# Patient Record
Sex: FEMALE | Race: WHITE | NOT HISPANIC OR LATINO | Employment: OTHER | URBAN - METROPOLITAN AREA
[De-identification: names, ages, dates, MRNs, and addresses within clinical notes are randomized per-mention and may not be internally consistent; named-entity substitution may affect disease eponyms.]

---

## 2017-11-07 DIAGNOSIS — Z12.31 VISIT FOR SCREENING MAMMOGRAM: Primary | ICD-10-CM

## 2017-12-05 ENCOUNTER — HOSPITAL ENCOUNTER (OUTPATIENT)
Dept: RADIOLOGY | Facility: HOSPITAL | Age: 82
Discharge: HOME | End: 2017-12-05
Attending: NURSE PRACTITIONER
Payer: MEDICARE

## 2017-12-05 DIAGNOSIS — Z12.31 VISIT FOR SCREENING MAMMOGRAM: ICD-10-CM

## 2017-12-05 PROCEDURE — 77067 SCR MAMMO BI INCL CAD: CPT | Mod: TC

## 2017-12-20 ENCOUNTER — OFFICE VISIT (OUTPATIENT)
Dept: HEMATOLOGY/ONCOLOGY | Facility: CLINIC | Age: 82
End: 2017-12-20
Payer: MEDICARE

## 2017-12-20 VITALS
TEMPERATURE: 98 F | WEIGHT: 149.19 LBS | DIASTOLIC BLOOD PRESSURE: 78 MMHG | SYSTOLIC BLOOD PRESSURE: 129 MMHG | BODY MASS INDEX: 27.45 KG/M2 | HEIGHT: 62 IN | RESPIRATION RATE: 18 BRPM | HEART RATE: 72 BPM

## 2017-12-20 DIAGNOSIS — Z86.718 HISTORY OF DVT IN ADULTHOOD: ICD-10-CM

## 2017-12-20 DIAGNOSIS — D51.8 ANEMIA OF DECREASED VITAMIN B12 ABSORPTION: ICD-10-CM

## 2017-12-20 DIAGNOSIS — R92.8 ABNORMALITY OF RIGHT BREAST ON SCREENING MAMMOGRAM: ICD-10-CM

## 2017-12-20 PROCEDURE — 99214 OFFICE O/P EST MOD 30 MIN: CPT | Mod: 25,,, | Performed by: INTERNAL MEDICINE

## 2017-12-20 PROCEDURE — 96372 THER/PROPH/DIAG INJ SC/IM: CPT | Mod: ,,, | Performed by: INTERNAL MEDICINE

## 2017-12-20 RX ORDER — WARFARIN SODIUM 5 MG/1
5 TABLET ORAL
COMMUNITY

## 2017-12-20 RX ORDER — CYANOCOBALAMIN 1000 UG/ML
1000 INJECTION, SOLUTION INTRAMUSCULAR; SUBCUTANEOUS
Status: CANCELLED | OUTPATIENT
Start: 2017-12-20

## 2017-12-20 RX ORDER — LISINOPRIL AND HYDROCHLOROTHIAZIDE 20; 25 MG/1; MG/1
1 TABLET ORAL
COMMUNITY

## 2017-12-20 RX ORDER — CYANOCOBALAMIN 1000 UG/ML
1000 INJECTION, SOLUTION INTRAMUSCULAR; SUBCUTANEOUS
Status: COMPLETED | OUTPATIENT
Start: 2017-12-20 | End: 2017-12-20

## 2017-12-20 RX ADMIN — CYANOCOBALAMIN 1000 MCG: 1000 INJECTION, SOLUTION INTRAMUSCULAR; SUBCUTANEOUS at 12:12

## 2017-12-20 NOTE — LETTER
December 20, 2017      Facundo Patel NP  [address]  [place] [address]           [facility] Hematology Oncology  [address]  [address]  [facility] [address]  Phone: [phone]  Fax: [phone]          Patient: Julianne Haynes   MR Number: [id number]   YOB: 1935   Date of Visit: 12/20/2017       Dear Facundo Patel:    Thank you for referring Julianne Haynes to me for evaluation. Attached you will find relevant portions of my assessment and plan of care.    If you have questions, please do not hesitate to call me. I look forward to following Julianne Haynes along with you.    Sincerely,    BRYSON Jackman MD    Enclosure  CC:  No Recipients    If you would like to receive this communication electronically, please contact [email or web address] or [phone] to request more information on [employer] access.    For providers and/or their staff who would like to refer a patient to [facility], please contact us through our one-stop-shop provider referral line, [facility], at [phone].    If you feel you have received this communication in error or would no longer like to receive these types of communications, please e-mail [email or web address]

## 2017-12-21 NOTE — PROGRESS NOTES
"     [facility] History & Physical    Subjective:      Patient ID:   Julianne Haynes  82 y.o. female  1935  [place]      Chief Complaint:   Abn mamm.    HPI:  82 y.o. female with diagnosis of DVT, B12 deficiency.  Recent abn. Mamm. of R breast.  Diagnostic mamm ordered.  No recent clot sx, stronger on B12.  Hx HTN.  S/P knee arthroscopy.  Smoke no  ETOH no  Allergy codeine  Recently retired "[employer]".    ROS:   GEN: normal without any fever, night sweats or weight loss  HEENT: normal with no HA's, sore throat, stiff neck, changes in vision  CV: normal with no CP, SOB, PND, NUNN or orthopnea  PULM: normal with no SOB, cough, hemoptysis, sputum or pleuritic pain  GI: normal with no abdominal pain, nausea, vomiting, constipation, diarrhea, melanotic stools, BRBPR, or hematemesis  : normal with no hematuria, dysuria  BREAST: normal with no mass, discharge, pain  SKIN: normal with no rash, erythema, bruising, or swelling     Past Medical History:   Diagnosis Date    Hypertension      Past Surgical History:   Procedure Laterality Date    TOTAL KNEE ARTHROPLASTY      LEFT AND RIGHT       Review of patient's allergies indicates:   Allergen Reactions    Codeine      Social History     Social History    Marital status:      Spouse name: N/A    Number of children: N/A    Years of education: N/A     Occupational History    Not on file.     Social History Main Topics    Smoking status: Not on file    Smokeless tobacco: Not on file    Alcohol use Not on file    Drug use: Unknown    Sexual activity: Not on file     Other Topics Concern    Not on file     Social History Narrative    No narrative on file         Current Outpatient Prescriptions:     FLUAD 9147-3295, 65 YR UP,,PF, 45 mcg (15 mcg x 3)/0.5 mL Syrg, inject 0.5 milliliter intramuscularly, Disp: , Rfl: 0    hydrocodone-acetaminophen 5-325mg (NORCO) 5-325 mg per tablet, Take 1 tablet by mouth every 4 (four) hours as needed for Pain., Disp: 15 " "tablet, Rfl: 0    lisinopril-hydrochlorothiazide (PRINZIDE,ZESTORETIC) 20-25 mg Tab, Take 1 tablet by mouth once daily., Disp: , Rfl:     lisinopril-hydrochlorothiazide (PRINZIDE,ZESTORETIC) 20-25 mg Tab, Take 1 tablet by mouth., Disp: , Rfl:     warfarin (COUMADIN) 5 MG tablet, Take 5 mg by mouth once daily., Disp: , Rfl:     warfarin (COUMADIN) 5 MG tablet, Take 5 mg by mouth., Disp: , Rfl:   No current facility-administered medications for this visit.           Objective:   Vitals:  Blood pressure 129/78, pulse 72, temperature 97.5 °F (36.4 °C), resp. rate 18, height 5' 2" (1.575 m), weight 67.7 kg (149 lb 3.2 oz).    Physical Examination:   GEN: no apparent distress, comfortable  HEAD: atraumatic and normocephalic  EYES: no pallor, no icterus  ENT: no pharyngeal erythema, external ears WNL; no nasal discharge  NECK: no masses, thyroid normal, trachea midline, no LAD/LN's, supple  CV: RRR with no murmur; normal pulse; normal S1 and S2; no pedal edema  CHEST: Normal respiratory effort; CTAB; normal breath sounds; no wheeze or crackles  ABDOM: nontender and nondistended; soft; normal bowel sounds; no rebound/guarding  MUSC/Skeletal: ROM normal; no crepitus; joints normal; no deformities or arthropathy  EXTREM: no clubbing, cyanosis, inflammation or swelling  SKIN: no rashes, lesions, ulcers, petechiae   : no chu  NEURO: grossly intact; motor/sensory WNL; no tremors  PSYCH: normal mood, affect and behavior  LYMPH: normal cervical, supraclavicular, axillary and groin LN's  BREASTS: no palpable mass      Labs:   Lab Results   Component Value Date    WBC 6.60 11/02/2015    HGB 12.7 11/02/2015    HCT 39.2 11/02/2015    MCV 94 11/02/2015     11/02/2015    CMP  Sodium   Date Value Ref Range Status   11/02/2015 141 136 - 145 mmol/L Final     Potassium   Date Value Ref Range Status   11/02/2015 4.1 3.5 - 5.1 mmol/L Final     Chloride   Date Value Ref Range Status   11/02/2015 108 95 - 110 mmol/L Final     CO2 "   Date Value Ref Range Status   11/02/2015 25 23 - 29 mmol/L Final     Glucose   Date Value Ref Range Status   11/02/2015 96 70 - 110 mg/dL Final     BUN, Bld   Date Value Ref Range Status   11/02/2015 32 (H) 8 - 23 mg/dL Final     Creatinine   Date Value Ref Range Status   11/02/2015 1.4 0.5 - 1.4 mg/dL Final     Calcium   Date Value Ref Range Status   11/02/2015 9.8 8.7 - 10.5 mg/dL Final     Total Protein   Date Value Ref Range Status   11/02/2015 8.0 6.0 - 8.4 g/dL Final     Albumin   Date Value Ref Range Status   11/02/2015 4.0 3.5 - 5.2 g/dL Final     Total Bilirubin   Date Value Ref Range Status   11/02/2015 1.1 (H) 0.1 - 1.0 mg/dL Final     Comment:     For infants and newborns, interpretation of results should be based  on gestational age, weight and in agreement with clinical  observations.  Premature Infant recommended reference ranges:  Up to 24 hours.............<8.0 mg/dL  Up to 48 hours............<12.0 mg/dL  3-5 days..................<15.0 mg/dL  6-29 days.................<15.0 mg/dL       Alkaline Phosphatase   Date Value Ref Range Status   11/02/2015 119 55 - 135 U/L Final     AST   Date Value Ref Range Status   11/02/2015 28 10 - 40 U/L Final     ALT   Date Value Ref Range Status   11/02/2015 19 10 - 44 U/L Final     Anion Gap   Date Value Ref Range Status   11/02/2015 8 8 - 16 mmol/L Final     eGFR if    Date Value Ref Range Status   11/02/2015 41 (A) >60 mL/min/1.73 m^2 Final     eGFR if non    Date Value Ref Range Status   11/02/2015 36 (A) >60 mL/min/1.73 m^2 Final     Comment:     Calculation used to obtain the estimated glomerular filtration  rate (eGFR) is the CKD-EPI equation. Since race is unknown   in our information system, the eGFR values for   -American and Non--American patients are given   for each creatinine result.         Radiology/Diagnostic :    Screening mamm assymetry seen R breast      Assessment:   (1) 82 y.o. female with  diagnosis of DVT hx.    (2)Abn. Mamm. R breast.  Further evaluation to get diagnostic mamm and U/S if needed.    (3)B12 shot today.    RTC 2-3 weeks.          1. Anemia of decreased vitamin B12 absorption    2. History of DVT in adulthood    3. Abnormality of right breast on screening mammogram               Anemia of decreased vitamin B12 absorption  -     cyanocobalamin injection 1,000 mcg; Inject 1 mL (1,000 mcg total) into the skin one time.    History of DVT in adulthood    Abnormality of right breast on screening mammogram    Other orders  -     cyanocobalamin injection 1,000 mcg; Inject 1 mL (1,000 mcg total) into the muscle one time.

## 2017-12-29 ENCOUNTER — HOSPITAL ENCOUNTER (OUTPATIENT)
Dept: RADIOLOGY | Facility: HOSPITAL | Age: 82
Discharge: HOME | End: 2017-12-29
Attending: NURSE PRACTITIONER
Payer: MEDICARE

## 2017-12-29 DIAGNOSIS — R92.8 ABNORMAL MAMMOGRAM OF RIGHT BREAST: ICD-10-CM

## 2017-12-29 PROCEDURE — 77061 BREAST TOMOSYNTHESIS UNI: CPT | Mod: TC

## 2017-12-29 PROCEDURE — 77065 DX MAMMO INCL CAD UNI: CPT | Mod: 26,,, | Performed by: RADIOLOGY

## 2017-12-29 PROCEDURE — 77061 BREAST TOMOSYNTHESIS UNI: CPT | Mod: 26,,, | Performed by: RADIOLOGY

## 2018-01-03 ENCOUNTER — OFFICE VISIT (OUTPATIENT)
Dept: HEMATOLOGY/ONCOLOGY | Facility: CLINIC | Age: 83
End: 2018-01-03
Payer: MEDICARE

## 2018-01-03 VITALS
HEART RATE: 66 BPM | SYSTOLIC BLOOD PRESSURE: 138 MMHG | WEIGHT: 150.19 LBS | HEIGHT: 62 IN | DIASTOLIC BLOOD PRESSURE: 82 MMHG | TEMPERATURE: 98 F | BODY MASS INDEX: 27.64 KG/M2 | RESPIRATION RATE: 18 BRPM

## 2018-01-03 DIAGNOSIS — D51.8 ANEMIA OF DECREASED VITAMIN B12 ABSORPTION: Primary | ICD-10-CM

## 2018-01-03 DIAGNOSIS — R92.8 ABNORMALITY OF RIGHT BREAST ON SCREENING MAMMOGRAM: ICD-10-CM

## 2018-01-03 DIAGNOSIS — Z86.718 HISTORY OF DVT IN ADULTHOOD: ICD-10-CM

## 2018-01-03 PROCEDURE — 99213 OFFICE O/P EST LOW 20 MIN: CPT | Mod: ,,, | Performed by: INTERNAL MEDICINE

## 2018-01-03 NOTE — LETTER
January 5, 2018      Facundo Patel NP  [address]  [place] [address]           [facility] Hematology Oncology  [address]  [address]  [facility] [address]  Phone: [phone]  Fax: [phone]          Patient: Julianne Haynes   MR Number: [id number]   YOB: 1935   Date of Visit: 1/3/2018       Dear Facundo Patel:    Thank you for referring Julianne Haynes to me for evaluation. Attached you will find relevant portions of my assessment and plan of care.    If you have questions, please do not hesitate to call me. I look forward to following Julianne Haynes along with you.    Sincerely,    BRYSON Jackman MD    Enclosure  CC:  No Recipients    If you would like to receive this communication electronically, please contact [email or web address] or [phone] to request more information on [employer] access.    For providers and/or their staff who would like to refer a patient to [facility], please contact us through our one-stop-shop provider referral line, [facility], at [phone].    If you feel you have received this communication in error or would no longer like to receive these types of communications, please e-mail [email or web address]

## 2018-01-06 NOTE — PROGRESS NOTES
"     [facility] History & Physical    Subjective:      Patient ID:   Julianne Haynes  82 y.o. female  1935  [place]      Chief Complaint:   Abn mamm.    HPI:  82 y.o. female with diagnosis of DVT, B12 deficiency.  Recent abn. Mamm. of R breast.  Diagnostic mamm ordered, no mass seen.  No recent clot sx, stronger on B12.  Hx HTN.  S/P knee arthroscopy.  Smoke no  ETOH no  Allergy codeine  Recently retired "[employer]".    ROS:   GEN: normal without any fever, night sweats or weight loss  HEENT: normal with no HA's, sore throat, stiff neck, changes in vision  CV: normal with no CP, SOB, PND, NUNN or orthopnea  PULM: normal with no SOB, cough, hemoptysis, sputum or pleuritic pain  GI: normal with no abdominal pain, nausea, vomiting, constipation, diarrhea, melanotic stools, BRBPR, or hematemesis  : normal with no hematuria, dysuria  BREAST: See HPI.   no mass, discharge, pain  SKIN: normal with no rash, erythema, bruising, or swelling     Past Medical History:   Diagnosis Date    Hypertension      Past Surgical History:   Procedure Laterality Date    TOTAL KNEE ARTHROPLASTY      LEFT AND RIGHT       Review of patient's allergies indicates:   Allergen Reactions    Codeine      Social History     Social History    Marital status:      Spouse name: N/A    Number of children: N/A    Years of education: N/A     Occupational History    Not on file.     Social History Main Topics    Smoking status: Not on file    Smokeless tobacco: Not on file    Alcohol use Not on file    Drug use: Unknown    Sexual activity: Not on file     Other Topics Concern    Not on file     Social History Narrative    No narrative on file         Current Outpatient Prescriptions:     FLUAD [id number], 65 YR UP,,PF, 45 mcg (15 mcg x 3)/0.5 mL Syrg, inject 0.5 milliliter intramuscularly, Disp: , Rfl: 0    hydrocodone-acetaminophen 5-325mg (NORCO) 5-325 mg per tablet, Take 1 tablet by mouth every 4 (four) hours as needed for Pain., " "Disp: 15 tablet, Rfl: 0    lisinopril-hydrochlorothiazide (PRINZIDE,ZESTORETIC) 20-25 mg Tab, Take 1 tablet by mouth once daily., Disp: , Rfl:     lisinopril-hydrochlorothiazide (PRINZIDE,ZESTORETIC) 20-25 mg Tab, Take 1 tablet by mouth., Disp: , Rfl:     warfarin (COUMADIN) 5 MG tablet, Take 5 mg by mouth once daily., Disp: , Rfl:     warfarin (COUMADIN) 5 MG tablet, Take 5 mg by mouth., Disp: , Rfl:           Objective:   Vitals:  Blood pressure 138/82, pulse 66, temperature 98 °F (36.7 °C), resp. rate 18, height 5' 2" (1.575 m), weight 68.1 kg (150 lb 3.2 oz).    Physical Examination:   GEN: no apparent distress, comfortable  HEAD: atraumatic and normocephalic  EYES: no pallor, no icterus  ENT: no pharyngeal erythema, external ears WNL; no nasal discharge  NECK: no masses, thyroid normal, trachea midline, no LAD/LN's, supple  CV: RRR with no murmur; normal pulse; normal S1 and S2; no pedal edema  CHEST: Normal respiratory effort; CTAB; normal breath sounds; no wheeze or crackles  ABDOM: nontender and nondistended; soft; normal bowel sounds; no rebound/guarding  MUSC/Skeletal: ROM normal; no crepitus; joints normal; no deformities or arthropathy  EXTREM: no clubbing, cyanosis, inflammation or swelling  SKIN: no rashes, lesions, ulcers, petechiae   : no chu  NEURO: grossly intact; motor/sensory WNL; no tremors  PSYCH: normal mood, affect and behavior  LYMPH: normal cervical, supraclavicular, axillary and groin LN's  BREASTS: no palpable mass      Labs:   Lab Results   Component Value Date    WBC 6.60 11/02/2015    HGB 12.7 11/02/2015    HCT 39.2 11/02/2015    MCV 94 11/02/2015     11/02/2015    CMP  Sodium   Date Value Ref Range Status   11/02/2015 141 136 - 145 mmol/L Final     Potassium   Date Value Ref Range Status   11/02/2015 4.1 3.5 - 5.1 mmol/L Final     Chloride   Date Value Ref Range Status   11/02/2015 108 95 - 110 mmol/L Final     CO2   Date Value Ref Range Status   11/02/2015 25 23 - 29 " mmol/L Final     Glucose   Date Value Ref Range Status   11/02/2015 96 70 - 110 mg/dL Final     BUN, Bld   Date Value Ref Range Status   11/02/2015 32 (H) 8 - 23 mg/dL Final     Creatinine   Date Value Ref Range Status   11/02/2015 1.4 0.5 - 1.4 mg/dL Final     Calcium   Date Value Ref Range Status   11/02/2015 9.8 8.7 - 10.5 mg/dL Final     Total Protein   Date Value Ref Range Status   11/02/2015 8.0 6.0 - 8.4 g/dL Final     Albumin   Date Value Ref Range Status   11/02/2015 4.0 3.5 - 5.2 g/dL Final     Total Bilirubin   Date Value Ref Range Status   11/02/2015 1.1 (H) 0.1 - 1.0 mg/dL Final     Comment:     For infants and newborns, interpretation of results should be based  on gestational age, weight and in agreement with clinical  observations.  Premature Infant recommended reference ranges:  Up to 24 hours.............<8.0 mg/dL  Up to 48 hours............<12.0 mg/dL  3-5 days..................<15.0 mg/dL  6-29 days.................<15.0 mg/dL       Alkaline Phosphatase   Date Value Ref Range Status   11/02/2015 119 55 - 135 U/L Final     AST   Date Value Ref Range Status   11/02/2015 28 10 - 40 U/L Final     ALT   Date Value Ref Range Status   11/02/2015 19 10 - 44 U/L Final     Anion Gap   Date Value Ref Range Status   11/02/2015 8 8 - 16 mmol/L Final     eGFR if    Date Value Ref Range Status   11/02/2015 41 (A) >60 mL/min/1.73 m^2 Final     eGFR if non    Date Value Ref Range Status   11/02/2015 36 (A) >60 mL/min/1.73 m^2 Final     Comment:     Calculation used to obtain the estimated glomerular filtration  rate (eGFR) is the CKD-EPI equation. Since race is unknown   in our information system, the eGFR values for   -American and Non--American patients are given   for each creatinine result.         Radiology/Diagnostic :    Screening mamm assymetry seen R breast  Diagnostic mamm/U/S no mass seen.      Assessment:   (1) 82 y.o. female with diagnosis of DVT  hx.    (2)Abn. Mamm. R breast.  Resolved.  Check again 1 year.    (3)B12 shot today.    RTC 6 months.

## 2018-07-12 RX ORDER — GABAPENTIN 300 MG/1
CAPSULE ORAL
COMMUNITY
Start: 2018-06-20 | End: 2018-11-01 | Stop reason: SDUPTHER

## 2018-08-02 PROBLEM — M54.16 LUMBAR RADICULOPATHY: Status: ACTIVE | Noted: 2018-08-02

## 2018-08-02 PROBLEM — M51.26 LUMBAR HERNIATED DISC: Status: ACTIVE | Noted: 2018-08-02

## 2018-08-02 PROBLEM — M41.9 LUMBAR SCOLIOSIS: Status: ACTIVE | Noted: 2018-08-02

## 2018-08-02 PROBLEM — M47.816 LUMBAR SPONDYLOSIS: Status: ACTIVE | Noted: 2018-08-02

## 2018-08-02 PROBLEM — M70.72 BILATERAL HIP BURSITIS: Status: ACTIVE | Noted: 2018-08-02

## 2018-08-02 PROBLEM — M51.36 DDD (DEGENERATIVE DISC DISEASE), LUMBAR: Status: ACTIVE | Noted: 2018-08-02

## 2018-08-02 PROBLEM — M70.71 BILATERAL HIP BURSITIS: Status: ACTIVE | Noted: 2018-08-02

## 2018-08-02 PROBLEM — M48.061 STENOSIS OF LATERAL RECESS OF LUMBAR SPINE: Status: ACTIVE | Noted: 2018-08-02

## 2018-08-02 PROBLEM — M85.80 OSTEOPENIA DETERMINED BY X-RAY: Status: ACTIVE | Noted: 2018-08-02

## 2019-01-09 DIAGNOSIS — Z12.31 ENCOUNTER FOR SCREENING MAMMOGRAM FOR MALIGNANT NEOPLASM OF BREAST: Primary | ICD-10-CM

## 2019-01-16 ENCOUNTER — OFFICE VISIT (OUTPATIENT)
Dept: HEMATOLOGY/ONCOLOGY | Facility: CLINIC | Age: 84
End: 2019-01-16
Payer: MEDICARE

## 2019-01-16 VITALS
WEIGHT: 152 LBS | HEART RATE: 82 BPM | RESPIRATION RATE: 20 BRPM | SYSTOLIC BLOOD PRESSURE: 168 MMHG | DIASTOLIC BLOOD PRESSURE: 86 MMHG | TEMPERATURE: 98 F | BODY MASS INDEX: 23.81 KG/M2

## 2019-01-16 DIAGNOSIS — R92.8 ABNORMALITY OF RIGHT BREAST ON SCREENING MAMMOGRAM: Primary | ICD-10-CM

## 2019-01-16 PROCEDURE — 99214 OFFICE O/P EST MOD 30 MIN: CPT | Mod: ,,, | Performed by: INTERNAL MEDICINE

## 2019-01-16 PROCEDURE — 99214 PR OFFICE/OUTPT VISIT, EST, LEVL IV, 30-39 MIN: ICD-10-PCS | Mod: ,,, | Performed by: INTERNAL MEDICINE

## 2019-01-18 NOTE — PROGRESS NOTES
"     [facility] History & Physical    Subjective:      Patient ID:   Julianne Haynes  83 y.o. female  1935  [place]      Chief Complaint:   Abn mamm.    HPI:  83 y.o. female with diagnosis of DVT, B12 deficiency.  Recent abn. Mamm. of R breast.  Diagnostic mamm ordered, no mass seen.  No recent clot sx, stronger on B12.  Hx HTN.  S/P knee arthroscopy.  Smoke no  ETOH no  Allergy codeine  Recently retired "[employer]".    She recently fell and hit her right knee. She had to have repeat right knee replacement per Dr. Benton.  She is on B-12 injections monthly.  Mammogram is pending.    ROS:   GEN: normal without any fever, night sweats or weight loss  HEENT: normal with no HA's, sore throat, stiff neck, changes in vision  CV: normal with no CP, SOB, PND, NUNN or orthopnea  PULM: normal with no SOB, cough, hemoptysis, sputum or pleuritic pain  GI: normal with no abdominal pain, nausea, vomiting, constipation, diarrhea, melanotic stools, BRBPR, or hematemesis  : normal with no hematuria, dysuria  BREAST: See HPI.   no mass, discharge, pain  SKIN: normal with no rash, erythema, bruising, or swelling     Past Medical History:   Diagnosis Date    Hypertension      Past Surgical History:   Procedure Laterality Date    steroid injection in back      TOTAL KNEE ARTHROPLASTY      LEFT AND RIGHT       Review of patient's allergies indicates:   Allergen Reactions    Codeine      Social History     Socioeconomic History    Marital status:      Spouse name: Not on file    Number of children: Not on file    Years of education: Not on file    Highest education level: Not on file   Social Needs    Financial resource strain: Not on file    Food insecurity - worry: Not on file    Food insecurity - inability: Not on file    Transportation needs - medical: Not on file    Transportation needs - non-medical: Not on file   Occupational History    Not on file   Tobacco Use    Smoking status: Not on file   Substance and " Sexual Activity    Alcohol use: Not on file    Drug use: Not on file    Sexual activity: Not on file   Other Topics Concern    Not on file   Social History Narrative    Not on file         Current Outpatient Medications:     lisinopril-hydrochlorothiazide (PRINZIDE,ZESTORETIC) 20-25 mg Tab, Take 1 tablet by mouth once daily., Disp: , Rfl:     warfarin (COUMADIN) 5 MG tablet, Take 5 mg by mouth once daily., Disp: , Rfl:     FLUAD [id number], 65 YR UP,,PF, 45 mcg (15 mcg x 3)/0.5 mL Syrg, inject 0.5 milliliter intramuscularly, Disp: , Rfl: 0    gabapentin (NEURONTIN) 100 MG capsule, Take 1 capsule (100 mg total) by mouth every evening., Disp: 90 capsule, Rfl: 0    hydrocodone-acetaminophen 5-325mg (NORCO) 5-325 mg per tablet, Take 1 tablet by mouth every 4 (four) hours as needed for Pain., Disp: 15 tablet, Rfl: 0    lisinopril-hydrochlorothiazide (PRINZIDE,ZESTORETIC) 20-25 mg Tab, Take 1 tablet by mouth., Disp: , Rfl:     warfarin (COUMADIN) 5 MG tablet, Take 5 mg by mouth., Disp: , Rfl:           Objective:   Vitals:  Blood pressure (!) 168/86, pulse 82, temperature 97.5 °F (36.4 °C), resp. rate 20, weight 68.9 kg (152 lb).    Physical Examination:   GEN: no apparent distress, comfortable  HEAD: atraumatic and normocephalic  EYES: no pallor, no icterus  ENT: no pharyngeal erythema, external ears WNL; no nasal discharge  NECK: no masses, thyroid normal, trachea midline, no LAD/LN's, supple  CV: RRR with no murmur; normal pulse; normal S1 and S2; no pedal edema  CHEST: Normal respiratory effort; CTAB; normal breath sounds; no wheeze or crackles  ABDOM: nontender and nondistended; soft; normal bowel sounds; no rebound/guarding  MUSC/Skeletal: ROM normal; no crepitus; joints normal; no deformities   EXTREM: no clubbing, cyanosis, inflammation or swelling  SKIN: no rashes, lesions, ulcers, petechiae   : no chu  NEURO: grossly intact; motor/sensory WNL; no tremors  PSYCH: normal mood, affect and  behavior  LYMPH: normal cervical, supraclavicular, axillary and groin LN's  BREASTS: no palpable mass      Labs:   Lab Results   Component Value Date    WBC 6.60 11/02/2015    HGB 12.7 11/02/2015    HCT 39.2 11/02/2015    MCV 94 11/02/2015     11/02/2015    CMP  Sodium   Date Value Ref Range Status   11/02/2015 141 136 - 145 mmol/L Final     Potassium   Date Value Ref Range Status   11/02/2015 4.1 3.5 - 5.1 mmol/L Final     Chloride   Date Value Ref Range Status   11/02/2015 108 95 - 110 mmol/L Final     CO2   Date Value Ref Range Status   11/02/2015 25 23 - 29 mmol/L Final     Glucose   Date Value Ref Range Status   11/02/2015 96 70 - 110 mg/dL Final     BUN, Bld   Date Value Ref Range Status   11/02/2015 32 (H) 8 - 23 mg/dL Final     Creatinine   Date Value Ref Range Status   11/02/2015 1.4 0.5 - 1.4 mg/dL Final     Calcium   Date Value Ref Range Status   11/02/2015 9.8 8.7 - 10.5 mg/dL Final     Total Protein   Date Value Ref Range Status   11/02/2015 8.0 6.0 - 8.4 g/dL Final     Albumin   Date Value Ref Range Status   11/02/2015 4.0 3.5 - 5.2 g/dL Final     Total Bilirubin   Date Value Ref Range Status   11/02/2015 1.1 (H) 0.1 - 1.0 mg/dL Final     Comment:     For infants and newborns, interpretation of results should be based  on gestational age, weight and in agreement with clinical  observations.  Premature Infant recommended reference ranges:  Up to 24 hours.............<8.0 mg/dL  Up to 48 hours............<12.0 mg/dL  3-5 days..................<15.0 mg/dL  6-29 days.................<15.0 mg/dL       Alkaline Phosphatase   Date Value Ref Range Status   11/02/2015 119 55 - 135 U/L Final     AST   Date Value Ref Range Status   11/02/2015 28 10 - 40 U/L Final     ALT   Date Value Ref Range Status   11/02/2015 19 10 - 44 U/L Final     Anion Gap   Date Value Ref Range Status   11/02/2015 8 8 - 16 mmol/L Final     eGFR if    Date Value Ref Range Status   11/02/2015 41 (A) >60 mL/min/1.73  m^2 Final     eGFR if non    Date Value Ref Range Status   11/02/2015 36 (A) >60 mL/min/1.73 m^2 Final     Comment:     Calculation used to obtain the estimated glomerular filtration  rate (eGFR) is the CKD-EPI equation. Since race is unknown   in our information system, the eGFR values for   -American and Non--American patients are given   for each creatinine result.         Radiology/Diagnostic :    Screening mamm assymetry seen R breast  Diagnostic mamm/U/S no mass seen.    Follow-up mammogram to be done in January 2019.      Assessment:   (1) 83 y.o. female with diagnosis of DVT hx.    (2) status post recent injury to knee with repeat knee replacement surgery.    (3)B12 shot today.    Await results of mammogram. Return to clinic 1 month, and canceled. Return to clinic 1 year.

## 2019-01-21 ENCOUNTER — TELEPHONE (OUTPATIENT)
Dept: HEMATOLOGY/ONCOLOGY | Facility: CLINIC | Age: 84
End: 2019-01-21

## 2019-01-21 DIAGNOSIS — R92.8 ABNORMALITY OF RIGHT BREAST ON SCREENING MAMMOGRAM: Primary | ICD-10-CM

## 2019-02-01 ENCOUNTER — HOSPITAL ENCOUNTER (OUTPATIENT)
Dept: RADIOLOGY | Facility: HOSPITAL | Age: 84
Discharge: HOME | End: 2019-02-01
Attending: SPECIALIST
Payer: MEDICARE

## 2019-02-01 ENCOUNTER — TELEPHONE (OUTPATIENT)
Dept: HEMATOLOGY/ONCOLOGY | Facility: CLINIC | Age: 84
End: 2019-02-01

## 2019-02-01 ENCOUNTER — HOSPITAL ENCOUNTER (OUTPATIENT)
Dept: RADIOLOGY | Facility: HOSPITAL | Age: 84
Discharge: HOME | End: 2019-02-01
Attending: INTERNAL MEDICINE
Payer: MEDICARE

## 2019-02-01 DIAGNOSIS — R92.8 ABNORMALITY OF RIGHT BREAST ON SCREENING MAMMOGRAM: ICD-10-CM

## 2019-02-01 DIAGNOSIS — M85.80 OSTEOPENIA DETERMINED BY X-RAY: ICD-10-CM

## 2019-02-01 DIAGNOSIS — M48.56XA COLLAPSED VERTEBRA, NOT ELSEWHERE CLASSIFIED, LUMBAR REGION, INITIAL ENCOUNTER FOR FRACTURE: ICD-10-CM

## 2019-02-01 PROCEDURE — 77062 BREAST TOMOSYNTHESIS BI: CPT | Mod: 26,,, | Performed by: RADIOLOGY

## 2019-02-01 PROCEDURE — 77080 DEXA BONE DENSITY SPINE HIP: ICD-10-PCS | Mod: 26,,, | Performed by: RADIOLOGY

## 2019-02-01 PROCEDURE — 77080 DXA BONE DENSITY AXIAL: CPT | Mod: TC

## 2019-02-01 PROCEDURE — 77066 DX MAMMO INCL CAD BI: CPT | Mod: TC

## 2019-02-01 PROCEDURE — 77062 MAMMO DIGITAL DIAGNOSTIC BILAT WITH TOMOSYNTHESIS_CAD: ICD-10-PCS | Mod: 26,,, | Performed by: RADIOLOGY

## 2019-02-01 PROCEDURE — 77066 MAMMO DIGITAL DIAGNOSTIC BILAT WITH TOMOSYNTHESIS_CAD: ICD-10-PCS | Mod: 26,,, | Performed by: RADIOLOGY

## 2019-02-01 PROCEDURE — 77080 DXA BONE DENSITY AXIAL: CPT | Mod: 26,,, | Performed by: RADIOLOGY

## 2019-02-01 PROCEDURE — 77066 DX MAMMO INCL CAD BI: CPT | Mod: 26,,, | Performed by: RADIOLOGY

## 2019-02-01 NOTE — TELEPHONE ENCOUNTER
Maria Elena . Tell her mammogram was negative for cancer.  She can cancel her appointment for February 20, and return to clinic here in 1 year.

## 2019-02-04 ENCOUNTER — TELEPHONE (OUTPATIENT)
Dept: HEMATOLOGY/ONCOLOGY | Facility: CLINIC | Age: 84
End: 2019-02-04

## 2019-02-04 NOTE — TELEPHONE ENCOUNTER
mammogram was negative for cancer.  She can cancel her appointment for February 20, and return to clinic here in 1 year.

## 2019-10-31 ENCOUNTER — OFFICE VISIT (OUTPATIENT)
Dept: HEMATOLOGY/ONCOLOGY | Facility: CLINIC | Age: 84
End: 2019-10-31
Payer: MEDICARE

## 2019-10-31 VITALS
TEMPERATURE: 97 F | OXYGEN SATURATION: 97 % | HEART RATE: 54 BPM | WEIGHT: 135 LBS | SYSTOLIC BLOOD PRESSURE: 138 MMHG | DIASTOLIC BLOOD PRESSURE: 84 MMHG | BODY MASS INDEX: 21.14 KG/M2

## 2019-10-31 DIAGNOSIS — R92.8 ABNORMALITY OF RIGHT BREAST ON SCREENING MAMMOGRAM: ICD-10-CM

## 2019-10-31 DIAGNOSIS — D51.8 ANEMIA OF DECREASED VITAMIN B12 ABSORPTION: Primary | ICD-10-CM

## 2019-10-31 PROCEDURE — 99214 PR OFFICE/OUTPT VISIT, EST, LEVL IV, 30-39 MIN: ICD-10-PCS | Mod: 25,S$GLB,, | Performed by: INTERNAL MEDICINE

## 2019-10-31 PROCEDURE — 96372 THER/PROPH/DIAG INJ SC/IM: CPT | Mod: S$GLB,,, | Performed by: INTERNAL MEDICINE

## 2019-10-31 PROCEDURE — 99214 OFFICE O/P EST MOD 30 MIN: CPT | Mod: 25,S$GLB,, | Performed by: INTERNAL MEDICINE

## 2019-10-31 PROCEDURE — 96372 PR INJECTION,THERAP/PROPH/DIAG2ST, IM OR SUBCUT: ICD-10-PCS | Mod: S$GLB,,, | Performed by: INTERNAL MEDICINE

## 2019-10-31 RX ORDER — CYANOCOBALAMIN 1000 UG/ML
1000 INJECTION, SOLUTION INTRAMUSCULAR; SUBCUTANEOUS
Status: SHIPPED | OUTPATIENT
Start: 2019-10-31

## 2019-10-31 RX ADMIN — CYANOCOBALAMIN 1000 MCG: 1000 INJECTION, SOLUTION INTRAMUSCULAR; SUBCUTANEOUS at 01:10

## 2019-10-31 NOTE — PROGRESS NOTES
" [facility] History & Physical    Subjective:      Patient ID:   Julianne Hyanes  84 y.o. female  1935  [place]      Chief Complaint:   Abn mamm.    HPI:  84 y.o. female with diagnosis of DVT, B12 deficiency.  Recent abn. Mamm. of R breast.  Diagnostic mamm ordered, no mass seen.  No recent clot sx, stronger on B12.  Hx HTN.  S/P knee arthroscopy.  Smoke no  ETOH no  Allergy codeine  Recently retired "[employer]".    She recently fell and hit her right knee. She had to have repeat right knee replacement per Dr. Benton.  She is on B-12 injections monthly.  Hx pelvic fx, no further falls.    ROS:   GEN: normal without any fever, night sweats or weight loss  HEENT: normal with no HA's, sore throat, stiff neck, changes in vision  CV: normal with no CP, SOB, PND, NUNN or orthopnea  PULM: normal with no SOB, cough, hemoptysis, sputum or pleuritic pain  GI: normal with no abdominal pain, nausea, vomiting, constipation, diarrhea, melanotic stools, BRBPR, or hematemesis  : normal with no hematuria, dysuria  BREAST: See HPI.   no mass, discharge, pain  SKIN: normal with no rash, erythema, bruising, or swelling     Past Medical History:   Diagnosis Date    Hypertension      Past Surgical History:   Procedure Laterality Date    steroid injection in back      TOTAL KNEE ARTHROPLASTY      LEFT AND RIGHT       Review of patient's allergies indicates:   Allergen Reactions    Codeine            Current Outpatient Medications:     FLUAD [id number], 65 YR UP,,PF, 45 mcg (15 mcg x 3)/0.5 mL Syrg, inject 0.5 milliliter intramuscularly, Disp: , Rfl: 0    gabapentin (NEURONTIN) 100 MG capsule, TAKE 1 CAPSULE (100 MG TOTAL) BY MOUTH EVERY EVENING., Disp: 90 capsule, Rfl: 0    hydrocodone-acetaminophen 5-325mg (NORCO) 5-325 mg per tablet, Take 1 tablet by mouth every 4 (four) hours as needed for Pain., Disp: 15 tablet, Rfl: 0    lisinopril-hydrochlorothiazide (PRINZIDE,ZESTORETIC) 20-25 mg Tab, Take 1 tablet by mouth once daily., Disp: , " Rfl:     lisinopril-hydrochlorothiazide (PRINZIDE,ZESTORETIC) 20-25 mg Tab, Take 1 tablet by mouth., Disp: , Rfl:     warfarin (COUMADIN) 5 MG tablet, Take 5 mg by mouth once daily., Disp: , Rfl:     warfarin (COUMADIN) 5 MG tablet, Take 5 mg by mouth., Disp: , Rfl:     Current Facility-Administered Medications:     cyanocobalamin injection 1,000 mcg, 1,000 mcg, Subcutaneous, Q30 Days, BRYSON Jackman MD, 1,000 mcg at 10/31/19 1356          Objective:   Vitals:  Blood pressure 138/84, pulse (!) 54, temperature 96.7 °F (35.9 °C), weight 61.2 kg (135 lb), SpO2 97 %.    Physical Examination:   GEN: no apparent distress, comfortable  HEAD: atraumatic and normocephalic  EYES: no pallor, no icterus  ENT: no pharyngeal erythema, external ears WNL; no nasal discharge  NECK: no masses, thyroid normal, trachea midline, no LAD/LN's, supple  CV: RRR with no murmur; normal pulse; normal S1 and S2; no pedal edema  CHEST: Normal respiratory effort; CTAB; normal breath sounds; no wheeze or crackles  ABDOM: nontender and nondistended; soft; normal bowel sounds; no rebound/guarding  MUSC/Skeletal: ROM normal; no crepitus; joints normal; no deformities   EXTREM: no clubbing, cyanosis, inflammation or swelling  SKIN: no rashes, lesions, ulcers, petechiae   : no chu  NEURO: grossly intact; motor/sensory WNL; no tremors  PSYCH: normal mood, affect and behavior  LYMPH: normal cervical, supraclavicular, axillary and groin LN's  BREASTS: no palpable mass      Labs:   Lab Results   Component Value Date    WBC 6.60 11/02/2015    HGB 12.7 11/02/2015    HCT 39.2 11/02/2015    MCV 94 11/02/2015     11/02/2015    CMP  Sodium   Date Value Ref Range Status   11/02/2015 141 136 - 145 mmol/L Final     Potassium   Date Value Ref Range Status   11/02/2015 4.1 3.5 - 5.1 mmol/L Final     Chloride   Date Value Ref Range Status   11/02/2015 108 95 - 110 mmol/L Final     CO2   Date Value Ref Range Status   11/02/2015 25 23 - 29 mmol/L Final      Glucose   Date Value Ref Range Status   11/02/2015 96 70 - 110 mg/dL Final     BUN, Bld   Date Value Ref Range Status   11/02/2015 32 (H) 8 - 23 mg/dL Final     Creatinine   Date Value Ref Range Status   11/02/2015 1.4 0.5 - 1.4 mg/dL Final     Calcium   Date Value Ref Range Status   11/02/2015 9.8 8.7 - 10.5 mg/dL Final     Total Protein   Date Value Ref Range Status   11/02/2015 8.0 6.0 - 8.4 g/dL Final     Albumin   Date Value Ref Range Status   11/02/2015 4.0 3.5 - 5.2 g/dL Final     Total Bilirubin   Date Value Ref Range Status   11/02/2015 1.1 (H) 0.1 - 1.0 mg/dL Final     Comment:     For infants and newborns, interpretation of results should be based  on gestational age, weight and in agreement with clinical  observations.  Premature Infant recommended reference ranges:  Up to 24 hours.............<8.0 mg/dL  Up to 48 hours............<12.0 mg/dL  3-5 days..................<15.0 mg/dL  6-29 days.................<15.0 mg/dL       Alkaline Phosphatase   Date Value Ref Range Status   11/02/2015 119 55 - 135 U/L Final     AST   Date Value Ref Range Status   11/02/2015 28 10 - 40 U/L Final     ALT   Date Value Ref Range Status   11/02/2015 19 10 - 44 U/L Final     Anion Gap   Date Value Ref Range Status   11/02/2015 8 8 - 16 mmol/L Final     eGFR if    Date Value Ref Range Status   11/02/2015 41 (A) >60 mL/min/1.73 m^2 Final     eGFR if non    Date Value Ref Range Status   11/02/2015 36 (A) >60 mL/min/1.73 m^2 Final     Comment:     Calculation used to obtain the estimated glomerular filtration  rate (eGFR) is the CKD-EPI equation. Since race is unknown   in our information system, the eGFR values for   -American and Non--American patients are given   for each creatinine result.         Radiology/Diagnostic :    Screening mamm assymetry seen R breast  Diagnostic mamm/U/S no mass seen.    Follow-up mammogram to be done in January 2020      Assessment:   (1) 84 y.o.  female with diagnosis of DVT hx.    (2) status post recent injury to knee with repeat knee replacement surgery.    (3)B12 shot today. And monthly.    See me 2/2020.

## 2019-12-19 ENCOUNTER — CLINICAL SUPPORT (OUTPATIENT)
Dept: HEMATOLOGY/ONCOLOGY | Facility: CLINIC | Age: 84
End: 2019-12-19
Payer: MEDICARE

## 2019-12-19 DIAGNOSIS — D51.8 ANEMIA OF DECREASED VITAMIN B12 ABSORPTION: ICD-10-CM

## 2019-12-19 PROCEDURE — 96372 PR INJECTION,THERAP/PROPH/DIAG2ST, IM OR SUBCUT: ICD-10-PCS | Mod: S$GLB,,, | Performed by: INTERNAL MEDICINE

## 2019-12-19 PROCEDURE — 96372 THER/PROPH/DIAG INJ SC/IM: CPT | Mod: S$GLB,,, | Performed by: INTERNAL MEDICINE

## 2019-12-19 RX ADMIN — CYANOCOBALAMIN 1000 MCG: 1000 INJECTION, SOLUTION INTRAMUSCULAR; SUBCUTANEOUS at 12:12

## 2020-02-21 ENCOUNTER — TELEPHONE (OUTPATIENT)
Dept: HEMATOLOGY/ONCOLOGY | Facility: CLINIC | Age: 85
End: 2020-02-21

## 2021-01-10 ENCOUNTER — IMMUNIZATION (OUTPATIENT)
Dept: PRIMARY CARE CLINIC | Facility: CLINIC | Age: 86
End: 2021-01-10
Payer: MEDICARE

## 2021-01-10 DIAGNOSIS — Z23 NEED FOR VACCINATION: ICD-10-CM

## 2021-01-10 PROCEDURE — 0001A COVID-19, MRNA, LNP-S, PF, 30 MCG/0.3 ML DOSE VACCINE: CPT | Mod: S$GLB,,, | Performed by: FAMILY MEDICINE

## 2021-01-10 PROCEDURE — 0001A COVID-19, MRNA, LNP-S, PF, 30 MCG/0.3 ML DOSE VACCINE: ICD-10-PCS | Mod: S$GLB,,, | Performed by: FAMILY MEDICINE

## 2021-01-10 PROCEDURE — 91300 COVID-19, MRNA, LNP-S, PF, 30 MCG/0.3 ML DOSE VACCINE: CPT | Mod: S$GLB,,, | Performed by: FAMILY MEDICINE

## 2021-01-10 PROCEDURE — 91300 COVID-19, MRNA, LNP-S, PF, 30 MCG/0.3 ML DOSE VACCINE: ICD-10-PCS | Mod: S$GLB,,, | Performed by: FAMILY MEDICINE

## 2021-01-31 ENCOUNTER — IMMUNIZATION (OUTPATIENT)
Dept: PRIMARY CARE CLINIC | Facility: CLINIC | Age: 86
End: 2021-01-31
Payer: MEDICARE

## 2021-01-31 DIAGNOSIS — Z23 NEED FOR VACCINATION: Primary | ICD-10-CM

## 2021-01-31 PROCEDURE — 91300 COVID-19, MRNA, LNP-S, PF, 30 MCG/0.3 ML DOSE VACCINE: CPT | Mod: S$GLB,,, | Performed by: FAMILY MEDICINE

## 2021-01-31 PROCEDURE — 0002A COVID-19, MRNA, LNP-S, PF, 30 MCG/0.3 ML DOSE VACCINE: CPT | Mod: S$GLB,,, | Performed by: FAMILY MEDICINE

## 2021-01-31 PROCEDURE — 91300 COVID-19, MRNA, LNP-S, PF, 30 MCG/0.3 ML DOSE VACCINE: ICD-10-PCS | Mod: S$GLB,,, | Performed by: FAMILY MEDICINE

## 2021-01-31 PROCEDURE — 0002A COVID-19, MRNA, LNP-S, PF, 30 MCG/0.3 ML DOSE VACCINE: ICD-10-PCS | Mod: S$GLB,,, | Performed by: FAMILY MEDICINE

## 2021-10-22 ENCOUNTER — IMMUNIZATION (OUTPATIENT)
Dept: PRIMARY CARE CLINIC | Facility: CLINIC | Age: 86
End: 2021-10-22
Payer: MEDICARE

## 2021-10-22 DIAGNOSIS — Z23 NEED FOR VACCINATION: Primary | ICD-10-CM

## 2021-10-22 PROCEDURE — 0003A COVID-19, MRNA, LNP-S, PF, 30 MCG/0.3 ML DOSE VACCINE: CPT | Mod: S$GLB,,, | Performed by: FAMILY MEDICINE

## 2021-10-22 PROCEDURE — 0003A COVID-19, MRNA, LNP-S, PF, 30 MCG/0.3 ML DOSE VACCINE: ICD-10-PCS | Mod: S$GLB,,, | Performed by: FAMILY MEDICINE

## 2021-10-22 PROCEDURE — 91300 COVID-19, MRNA, LNP-S, PF, 30 MCG/0.3 ML DOSE VACCINE: ICD-10-PCS | Mod: S$GLB,,, | Performed by: FAMILY MEDICINE

## 2021-10-22 PROCEDURE — 91300 COVID-19, MRNA, LNP-S, PF, 30 MCG/0.3 ML DOSE VACCINE: CPT | Mod: S$GLB,,, | Performed by: FAMILY MEDICINE

## 2023-08-01 DIAGNOSIS — C44.219 BASAL CELL CARCINOMA (BCC) OF HELIX OF LEFT EAR: Primary | ICD-10-CM
